# Patient Record
Sex: FEMALE | Race: WHITE | URBAN - METROPOLITAN AREA
[De-identification: names, ages, dates, MRNs, and addresses within clinical notes are randomized per-mention and may not be internally consistent; named-entity substitution may affect disease eponyms.]

---

## 2018-02-19 ENCOUNTER — EMERGENCY (EMERGENCY)
Facility: HOSPITAL | Age: 58
LOS: 1 days | Discharge: HOME | End: 2018-02-19
Attending: EMERGENCY MEDICINE | Admitting: EMERGENCY MEDICINE
Payer: COMMERCIAL

## 2018-02-19 VITALS
HEART RATE: 80 BPM | OXYGEN SATURATION: 100 % | TEMPERATURE: 98 F | SYSTOLIC BLOOD PRESSURE: 131 MMHG | DIASTOLIC BLOOD PRESSURE: 81 MMHG | RESPIRATION RATE: 18 BRPM

## 2018-02-19 PROCEDURE — 99284 EMERGENCY DEPT VISIT MOD MDM: CPT

## 2018-02-19 PROCEDURE — 71046 X-RAY EXAM CHEST 2 VIEWS: CPT | Mod: 26

## 2018-02-19 RX ORDER — AMOXICILLIN 250 MG/5ML
500 SUSPENSION, RECONSTITUTED, ORAL (ML) ORAL ONCE
Qty: 0 | Refills: 0 | Status: COMPLETED | OUTPATIENT
Start: 2018-02-19 | End: 2018-02-19

## 2018-02-19 RX ORDER — IBUPROFEN 200 MG
600 TABLET ORAL ONCE
Qty: 0 | Refills: 0 | Status: COMPLETED | OUTPATIENT
Start: 2018-02-19 | End: 2018-02-19

## 2018-02-19 RX ORDER — AMOXICILLIN 250 MG/5ML
1 SUSPENSION, RECONSTITUTED, ORAL (ML) ORAL
Qty: 14 | Refills: 0 | OUTPATIENT
Start: 2018-02-19 | End: 2018-02-25

## 2018-02-19 RX ORDER — PSEUDOEPHEDRINE HCL 30 MG
30 TABLET ORAL ONCE
Qty: 0 | Refills: 0 | Status: COMPLETED | OUTPATIENT
Start: 2018-02-19 | End: 2018-02-19

## 2018-02-19 RX ORDER — ACETAMINOPHEN 500 MG
650 TABLET ORAL ONCE
Qty: 0 | Refills: 0 | Status: COMPLETED | OUTPATIENT
Start: 2018-02-19 | End: 2018-02-19

## 2018-02-19 RX ADMIN — Medication 600 MILLIGRAM(S): at 20:41

## 2018-02-19 RX ADMIN — Medication 500 MILLIGRAM(S): at 21:29

## 2018-02-19 RX ADMIN — Medication 650 MILLIGRAM(S): at 20:41

## 2018-02-19 RX ADMIN — Medication 30 MILLIGRAM(S): at 20:45

## 2018-02-19 NOTE — ED PROVIDER NOTE - MEDICAL DECISION MAKING DETAILS
multiple symptoms c/w viral syndrome, well appearing with stable vs, will check cxr to eval for pna, symptomatic tx, abx for otitis.

## 2018-02-19 NOTE — ED PROVIDER NOTE - OBJECTIVE STATEMENT
58 y/o f presents with multiple symptoms. Patient states approx 1.5 week ago develop nasal congestion, sneezing, cough, fevers, myalgias. SYmptoms persisted and then developed concurrent moderate constant left ear pain and fullness. Fevers improved, cough persists with ear pain. No ha, neck pain, cp, sob, abd pain, vomiting, diarrhea, dysuria.

## 2018-02-19 NOTE — ED PROVIDER NOTE - CARE PLAN
Principal Discharge DX:	Upper respiratory tract infection, unspecified type  Secondary Diagnosis:	Left otitis media, unspecified otitis media type

## 2023-06-13 PROBLEM — Z00.00 ENCOUNTER FOR PREVENTIVE HEALTH EXAMINATION: Status: ACTIVE | Noted: 2023-06-13

## 2023-06-22 ENCOUNTER — APPOINTMENT (OUTPATIENT)
Dept: OBGYN | Facility: CLINIC | Age: 63
End: 2023-06-22

## 2023-06-22 VITALS
WEIGHT: 135 LBS | SYSTOLIC BLOOD PRESSURE: 138 MMHG | BODY MASS INDEX: 26.5 KG/M2 | DIASTOLIC BLOOD PRESSURE: 82 MMHG | HEIGHT: 60 IN

## 2023-06-27 ENCOUNTER — APPOINTMENT (OUTPATIENT)
Dept: GYNECOLOGIC ONCOLOGY | Facility: CLINIC | Age: 63
End: 2023-06-27
Payer: COMMERCIAL

## 2023-06-27 VITALS
HEART RATE: 65 BPM | HEIGHT: 60 IN | BODY MASS INDEX: 25.91 KG/M2 | SYSTOLIC BLOOD PRESSURE: 137 MMHG | DIASTOLIC BLOOD PRESSURE: 74 MMHG | WEIGHT: 132 LBS

## 2023-06-27 DIAGNOSIS — Z86.39 PERSONAL HISTORY OF OTHER ENDOCRINE, NUTRITIONAL AND METABOLIC DISEASE: ICD-10-CM

## 2023-06-27 DIAGNOSIS — Z78.9 OTHER SPECIFIED HEALTH STATUS: ICD-10-CM

## 2023-06-27 DIAGNOSIS — Z80.0 FAMILY HISTORY OF MALIGNANT NEOPLASM OF DIGESTIVE ORGANS: ICD-10-CM

## 2023-06-27 DIAGNOSIS — Z87.891 PERSONAL HISTORY OF NICOTINE DEPENDENCE: ICD-10-CM

## 2023-06-27 DIAGNOSIS — Z80.6 FAMILY HISTORY OF LEUKEMIA: ICD-10-CM

## 2023-06-27 PROCEDURE — 99205 OFFICE O/P NEW HI 60 MIN: CPT

## 2023-06-27 RX ORDER — ATORVASTATIN CALCIUM 20 MG/1
20 TABLET, FILM COATED ORAL
Refills: 0 | Status: ACTIVE | COMMUNITY

## 2023-06-27 RX ORDER — ATORVASTATIN CALCIUM 80 MG/1
TABLET, FILM COATED ORAL
Refills: 0 | Status: ACTIVE | COMMUNITY

## 2023-06-28 LAB
CANCER AG125 SERPL-ACNC: 9 U/ML
CANCER AG19-9 SERPL-ACNC: 7 U/ML
CEA SERPL-MCNC: 7.4 NG/ML

## 2023-06-30 ENCOUNTER — TRANSCRIPTION ENCOUNTER (OUTPATIENT)
Age: 63
End: 2023-06-30

## 2023-08-08 ENCOUNTER — APPOINTMENT (OUTPATIENT)
Dept: GYNECOLOGIC ONCOLOGY | Facility: CLINIC | Age: 63
End: 2023-08-08
Payer: COMMERCIAL

## 2023-08-08 ENCOUNTER — NON-APPOINTMENT (OUTPATIENT)
Age: 63
End: 2023-08-08

## 2023-08-08 VITALS
DIASTOLIC BLOOD PRESSURE: 63 MMHG | WEIGHT: 134 LBS | HEART RATE: 66 BPM | BODY MASS INDEX: 26.31 KG/M2 | SYSTOLIC BLOOD PRESSURE: 109 MMHG | HEIGHT: 60 IN

## 2023-08-08 PROCEDURE — 99214 OFFICE O/P EST MOD 30 MIN: CPT

## 2023-08-09 NOTE — DISCUSSION/SUMMARY
[Reviewed Clinical Lab Test(s)] : Results of clinical tests were reviewed. [Reviewed Radiology Report(s)] : Radiology reports were reviewed. [Reviewed Radiology Film/Image(s)] : Images from radiology studies were reviewed and examined. [Discuss Tests w/Referring Providers] : Results of labs/radiology studies and the treatment recommendations were discussed with performing/referring physician. [Discuss Alternatives/Risks/Benefits w/Patient] : All alternatives, risks, and benefits were discussed with the patient/family and all questions were answered.  Patient expressed good understanding and appreciates the importance of follow up as recommended. [Visit Time ___ Minutes] : [unfilled] minutes [Face to Face Time___ Minutes] : with [unfilled] minutes in face to face consultation. [FreeTextEntry1] :  -  My[facility] Testing Today via saliva kit -  MRI pelvis - to be done early October after she finishes treatment for newly diagnosed breast cancer -  She will return end of October for follow up; discuss updated MRI and Myriad results. -  Pt verbalized understanding of plan and agrees. -  All questions answered.

## 2023-08-09 NOTE — HISTORY OF PRESENT ILLNESS
[FreeTextEntry1] : Ref: Dr. Castaneda PCP  Ms. Nichols, 63 years old, referred for a pelvic mass.  I initially saw her on 23.  Follow up plan included a follow up TVUS and tumor markers.   Initially, the only complaint was pain with full bladder and occasional dyspareunia; otherwise asymptomatic.   Pt has a hysterectomy in [facility] about 20 years ago for benign indications (i.e. fibroids) via pfannenstiel incision. She is also s/p breast surgery 23 for newly diagnosed breast cancer at [facility].  She recently  was informed of the adjuvant treatment plan for radiation which will occur over approximately 3-4 weeks. We discussed her recent imaging results and risks/benefits of surveillance versus surgical management in detail.   Labs: 23:   = 9;  = 7;  CEA = 7.4  Imagin23 TVUS ([facility]) Pt is s/p hysterectomy.  The ovaries are not visualized likely secondary to either removal or atrophy.   Again noted is a right sided lobulated complex cystic mass with septation as seen on prior MRI and ultrasound. the mass measures 6.8 x 3.8 x 4 cm.  2023 MRI Pelvis [address] Radiology Findings: Uterus: Not visualized Adnexa;: A right adnexal cystic mass is visualized measuring 7.8 x 3.9 x 3.9 cm. It is lobulated in shape. with multiple enhancing septations. No fat or solid enhancing component is visualized. A left adnexal cystic mass is visualized, measuring 3.0 x 2.0 x 2.8 cm, with several thin enhancing septations. No solid or fat component is visualized. Ascites: none urinary Bladder: Normal Lymph Nodes: Normal visualized Bowel: Normal Other: None Impression: 7.8 cm complex right adnexal cyst and 3.0 cm complex left adnexal cyst, represent MRI 0-RADS 3 lesions (approximately 5% rish of malignancy) Status post hysterectomy  2023 US Pelvic [address] Radiology Uterus: Not Seen Ovaries: Not seen, possibly shadowed by bowel gas. There is also a 8.2 cm complex multilocular cyst in the right adnexa Free Fluid: None Impression:  Hysterectomy 8.2 cm complex cyst right adnexa 0-Rads Score 3 Low rish Malignancy Follow up recommendations: MRI pelvis with and without contrast recommended Gynecologist consult recommended  POB: PGYN: No history of abn pap smears PMH: Cholesterol Meds: Atorvastatin Allergies: NKDA PSH: Hysterectomy Cholecystectomy, C/section Social Hx: ,  FH: +Mom/Sister Liver cancer () no genetic testing  Niece (M) breast   Sister Leukemia  Health Maintenance: Covid: received Mammogram: 3/29/2023 BIRAD 6 Colonoscopy: 3/15/2021 PAP: 3/7/2023 negative

## 2023-08-09 NOTE — CHIEF COMPLAINT
[Spouse] : spouse [FreeTextEntry1] : \par  \par  \par  New Patient Consultation for pelvic mass\par  \par  \par

## 2023-08-09 NOTE — PHYSICAL EXAM
[Chaperone Present] : A chaperone was present in the examining room during all aspects of the physical examination [Absent] : Uterus: Absent [Normal] : Recto-Vaginal Exam: Normal [Fully active, able to carry on all pre-disease performance without restriction] : Status 0 - Fully active, able to carry on all pre-disease performance without restriction [FreeTextEntry1] : Marissa DHILLON [de-identified] : pfannenstiel incision well healed  [de-identified] : no fullness appreciated bilaterally

## 2023-08-09 NOTE — ASSESSMENT
[FreeTextEntry1] : 62 yo with recent breast cancer diagnosis (plan for surgery 7/6/23) with likely need for adjuvant radiation referred her with newly found bilateral complex adnexal masses s/p remote open hysterectomy done for benign indications (i.e. fibroids), here for follow up consultation.  - Patients history and work up to date reviewed in detail. - Normal CA-125 and CA 19-9 but elevated CEA. Recommend GI referral to Dr. Alarcon.  - Differential diagnosis of this complex pelvic mass discussed in detail including; benign, premalignant and malignant etiologies. The use of diagrams and drawings were used to help facilitate the conversation throughout. Given the patient is symptomatic with pelvic pain my recommendation would be for surgical removal. The various routes of surgery were discussed in detail and including their risks and benefits and the patient desires to proceed with surgery via a minimally invasive approach via a robotic platform with the understanding of the possibility of a need of conversion to an open procedure. I discussed my clinical suspicion that this is likely a benign neoplasm and possible a benign inclusion cyst from scarring; however, definitive tissue diagnosis is needed to assure this. The -She is also s/p breast surgery 7/6/23 for newly diagnosed breast cancer at [facility].  She recently  was informed of the adjuvant treatment plan for radiation which will occur over approximately 3-4 weeks. We discussed her recent imaging results and risks/benefits of surveillance versus surgical management in detail. The patient would like to complete her breast cancer adjuvant treatment and follow up with close interval imaging with discussion of potential surgical management as indicated at that time.

## 2023-10-15 PROBLEM — C50.911 BREAST CANCER, RIGHT BREAST: Status: ACTIVE | Noted: 2023-06-27

## 2023-10-15 PROBLEM — N94.9 ADNEXAL CYST: Status: ACTIVE | Noted: 2023-06-27

## 2023-10-17 ENCOUNTER — APPOINTMENT (OUTPATIENT)
Dept: GYNECOLOGIC ONCOLOGY | Facility: CLINIC | Age: 63
End: 2023-10-17
Payer: COMMERCIAL

## 2023-10-17 VITALS
DIASTOLIC BLOOD PRESSURE: 61 MMHG | BODY MASS INDEX: 26.31 KG/M2 | HEART RATE: 70 BPM | HEIGHT: 60 IN | WEIGHT: 134 LBS | SYSTOLIC BLOOD PRESSURE: 100 MMHG

## 2023-10-17 DIAGNOSIS — C50.911 MALIGNANT NEOPLASM OF UNSPECIFIED SITE OF RIGHT FEMALE BREAST: ICD-10-CM

## 2023-10-17 DIAGNOSIS — N94.9 UNSPECIFIED CONDITION ASSOCIATED WITH FEMALE GENITAL ORGANS AND MENSTRUAL CYCLE: ICD-10-CM

## 2023-10-17 PROCEDURE — 99214 OFFICE O/P EST MOD 30 MIN: CPT

## 2023-10-18 LAB
CANCER AG125 SERPL-ACNC: 10 U/ML
CANCER AG19-9 SERPL-ACNC: 7 U/ML
CEA SERPL-MCNC: 0.9 NG/ML

## 2024-02-06 ENCOUNTER — APPOINTMENT (OUTPATIENT)
Dept: GYNECOLOGIC ONCOLOGY | Facility: CLINIC | Age: 64
End: 2024-02-06
Payer: COMMERCIAL

## 2024-02-06 VITALS
HEART RATE: 69 BPM | DIASTOLIC BLOOD PRESSURE: 68 MMHG | SYSTOLIC BLOOD PRESSURE: 121 MMHG | WEIGHT: 134 LBS | BODY MASS INDEX: 26.31 KG/M2 | HEIGHT: 60 IN

## 2024-02-06 PROCEDURE — 99213 OFFICE O/P EST LOW 20 MIN: CPT

## 2024-02-06 NOTE — ASSESSMENT
[FreeTextEntry1] : 64 yo with recent breast cancer diagnosis (plan for surgery 7/6/23) and completed adjuvant radiation now on anastrazole referred her with newly found bilateral complex adnexal masses s/p remote open hysterectomy done for benign indications (i.e. fibroids), here for follow up consultation.

## 2024-02-06 NOTE — HISTORY OF PRESENT ILLNESS
[FreeTextEntry1] : Ref: Dr. Castaneda PCP  Ms. Nichols, 63 years old, referred for a pelvic mass.  I initially saw her on 23.    Pt has a hysterectomy in [facility] about 20 years ago for benign indications (i.e. fibroids) via Pfannenstiel incision. She is also s/p breast surgery 23 for newly diagnosed breast cancer at [facility].  She recently was informed of the adjuvant treatment plan for radiation which will occur over approximately 3-4 weeks. We discussed her recent imaging results and risks/benefits of surveillance versus surgical management in detail. She desired to proceed with interval follow-up imaging and completed her RT treatment. She is currently on anastrazole. She had germline testing collected but her insurance did not cover it so it was 7.6 cm right adnexal complex cyst and a 2.7 cm left adnexal complex cyst, not significantly changed from 2023. She was again extensively counseled about the risks/benefits of surgery and surveillance as well as the limiatations of surveillance and desires to continue with surveillance. She is without complaint. She denies fevers, chills, night sweats, chest pain, SOB, changes in appetite, nausea, vomiting, increased abdominal girth, unintentional weight loss, abdominopelvic pain, lower extremity edema or pain and changes in bowel/bladder movements. Denies vaginal bleeding and abnormal vaginal discharge. Repeat CA-125 drawn today. We reviewed her recent MRI pelvis demonstrating Bilateral complex adnexal cyst likely benign, however, given their size and complexity combined with the patient's age continue close follow-up is recommended with the pre and postcontrast MRI of the pelvis. She is overall without complaint, recently treated for a UTI.  We discussed again the management options and risk/benefits of all options. Shared decision made to proceed with continued observation with close interval follow up after the end of May when repeat MRI pelvis is to be done. Education provided on signs/symptoms of recurrence and when to seek follow up sooner than our scheduled surveillance. She expressed verbal understanding.   Labs: 10/17/2023:  = 10; CA 19-9 = 7; CEA = 0.9 23:   = 9;  = 7;  CEA = 7.4  Imagin24 MRI pelvis ([facility]) Uterus: Removed. Ovaries: There are bilateral complex cysts.  There is a cystic structure in the right ovary which may be multiple cysts adjacent to each other or hydrosalpinx.  It measures 8.1 x 3.9 cm, previously 7.9 x 4 cm overall not significantly changed.  It is predominantly high T2 signal.  There is an intermediate T1 signal and does not enhance, consistent with a hemorrhagio-proteinacious component.  There is a smaller cystic lesion in left ovary measuring 2.8 x 1.5 cm, not significantly changed in size series 4 image 12.3 there are multiple cysts, which demonstrate high T2 signal.  There is no enhancement of the post contrast subtraction images.  These complex cysts are likely benign, however given their size and the patient's age continued MRI follow-up recommended in 6 months.  MRI 0 RADS score 2-almost certainly benign, less than 1% risk of malignancy. Adenopathy: No significant adenopathy is present Osseous structures: Without focal mass. Fluid: No abnormal pelvic fluid. Urinary bladder: Unremarkable.  No evidence of enhancing bladder mass. Other: None. Impression: Bilateral complex adnexal cyst likely benign, however, given their size and complexity combined with the patient's age continue close follow-up is recommended with the pre and postcontrast MRI of the pelvis in 6 months.  POB: PGYN: No history of abn pap smears PMH: Cholesterol Meds: Atorvastatin Allergies: NKDA PSH: Hysterectomy Cholecystectomy, C/section Social Hx: ,  FH: +Mom/Sister Liver cancer () no genetic testing  Niece (M) breast   Sister Leukemia  Health Maintenance: Covid: received Mammogram: 3/29/2023 BIRAD 6 Colonoscopy: 3/15/2021 PAP: 3/7/2023 negative

## 2024-02-06 NOTE — DISCUSSION/SUMMARY
[Reviewed Clinical Lab Test(s)] : Results of clinical tests were reviewed. [Reviewed Radiology Report(s)] : Radiology reports were reviewed. [Reviewed Radiology Film/Image(s)] : Images from radiology studies were reviewed and examined. [Discuss Tests w/Referring Providers] : Results of labs/radiology studies and the treatment recommendations were discussed with performing/referring physician. [Discuss Alternatives/Risks/Benefits w/Patient] : All alternatives, risks, and benefits were discussed with the patient/family and all questions were answered.  Patient expressed good understanding and appreciates the importance of follow up as recommended. [Visit Time ___ Minutes] : [unfilled] minutes [Face to Face Time___ Minutes] : with [unfilled] minutes in face to face consultation. [FreeTextEntry1] : - Patients history and work up to date reviewed in detail with both her and her partner. - She s/p breast surgery 7/6/23 for newly diagnosed breast cancer at [facility].  Now she is currently on anastrazole. She had myriad germline testing collected but her insurance did not cover it so it was 7.6 cm right adnexal complex cyst and a 2.7 cm left adnexal complex cyst, not significantly changed from 6/8/2023.  -Repeat CA-125 drawn today. We reviewed her recent MRI pelvis demonstrating Bilateral complex adnexal cyst likely benign, however, given their size and complexity combined with the patient's age continue close follow-up is recommended with the pre and postcontrast MRI of the pelvis. She is overall without complaint, recently treated for a UTI.  We discussed again the management options and risk/benefits of all options. Shared decision made to proceed with continued observation with close interval follow up after the end of May when repeat MRI pelvis is to be done. Education provided on signs/symptoms of recurrence and when to seek follow up sooner than our scheduled surveillance. She expressed verbal understanding. - For follow up MRI pelvis end of May with close follow up thereafter but precautions given to return sooner if she is to develop symptoms. - I spent the time noted on the day of this patient encounter preparing for, providing and documenting the above service. I have counseled and educated the patient on the differential, workup, disease course, and treatment/management plan. Education was provided to the patient during this encounter. All questions and concerns were answered and addressed in detail.

## 2024-02-06 NOTE — PHYSICAL EXAM
[Chaperone Present] : A chaperone was present in the examining room during all aspects of the physical examination [Absent] : Uterus: Absent [Normal] : Recto-Vaginal Exam: Normal [de-identified] : pfannenstiel incision well healed  [de-identified] : no fullness appreciated bilaterally [Fully active, able to carry on all pre-disease performance without restriction] : Status 0 - Fully active, able to carry on all pre-disease performance without restriction [FreeTextEntry1] : Marissa DHILLON

## 2024-02-08 LAB — CANCER AG125 SERPL-ACNC: 10 U/ML

## 2024-07-03 ENCOUNTER — RESULT REVIEW (OUTPATIENT)
Age: 64
End: 2024-07-03

## 2024-07-03 ENCOUNTER — OUTPATIENT (OUTPATIENT)
Dept: OUTPATIENT SERVICES | Facility: HOSPITAL | Age: 64
LOS: 1 days | End: 2024-07-03
Payer: COMMERCIAL

## 2024-07-03 ENCOUNTER — APPOINTMENT (OUTPATIENT)
Dept: MRI IMAGING | Facility: CLINIC | Age: 64
End: 2024-07-03
Payer: COMMERCIAL

## 2024-07-03 DIAGNOSIS — N94.9 UNSPECIFIED CONDITION ASSOCIATED WITH FEMALE GENITAL ORGANS AND MENSTRUAL CYCLE: ICD-10-CM

## 2024-07-03 PROCEDURE — 72197 MRI PELVIS W/O & W/DYE: CPT | Mod: 26

## 2024-07-03 PROCEDURE — 72197 MRI PELVIS W/O & W/DYE: CPT

## 2024-07-03 PROCEDURE — A9585: CPT

## 2024-08-02 ENCOUNTER — APPOINTMENT (OUTPATIENT)
Dept: GYNECOLOGIC ONCOLOGY | Facility: CLINIC | Age: 64
End: 2024-08-02
Payer: COMMERCIAL

## 2024-08-02 PROCEDURE — 99212 OFFICE O/P EST SF 10 MIN: CPT | Mod: 95

## 2024-08-02 NOTE — HISTORY OF PRESENT ILLNESS
[FreeTextEntry1] : **Please note that this visit was converted to telemed due to technical difficulties** Permission was granted for this visit.  Ref: Dr. Castaneda PCP  Ms. Nichols, 63 years old, referred for a pelvic mass.  I initially saw her on 23.    Pt has a hysterectomy in [facility] about 20 years ago for benign indications (i.e. fibroids) via Pfannenstiel incision. She is also s/p breast surgery 23 for newly diagnosed breast cancer at [facility].  She recently was informed of the adjuvant treatment plan for radiation which will occur over approximately 3-4 weeks. We discussed her recent imaging results and risks/benefits of surveillance versus surgical management in detail. She desired to proceed with interval follow-up imaging and completed her RT treatment. She is currently on anastrazole. She had germline testing collected but her insurance did not cover it so it was 7.6 cm right adnexal complex cyst and a 2.7 cm left adnexal complex cyst, not significantly changed from 2023. She was again extensively counseled about the risks/benefits of surgery and surveillance as well as the limiatations of surveillance and desires to continue with surveillance. She is without complaint. She denies fevers, chills, night sweats, chest pain, SOB, changes in appetite, nausea, vomiting, increased abdominal girth, unintentional weight loss, abdominopelvic pain, lower extremity edema or pain and changes in bowel/bladder movements. Denies vaginal bleeding and abnormal vaginal discharge. Repeat CA-125 drawn today. We reviewed her recent MRI pelvis demonstrating Bilateral complex adnexal cyst likely benign, however, given their size and complexity combined with the patient's age continue close follow-up is recommended with the pre and postcontrast MRI of the pelvis. She is overall without complaint, recently treated for a UTI.  We discussed again the management options and risk/benefits of all options. Shared decision made to proceed with continued observation with close interval follow up after the end of May when repeat MRI pelvis is to be done. Education provided on signs/symptoms of recurrence and when to seek follow up sooner than our scheduled surveillance. She expressed verbal understanding.  Today the patient presents via vteb to review her most recent MRI pelvis, which we discussed in detail (results below). She reports. She denies fevers, chills, night sweats, chest pain, SOB, changes in appetite, nausea, vomiting, increased abdominal girth, unintentional weight loss, abdominopelvic pain, lower extremity edema or pain and changes in bowel/bladder movements. Denies vaginal bleeding and abnormal vaginal discharge.   Labs: 10/17/2023:  = 10; CA 19-9 = 7; CEA = 0.9 23:   = 9;  = 7;  CEA = 7.4  Imaging: MRI pelvis 7/3/24:  FINDINGS: UTERUS: Hysterectomy.  RIGHT OVARY/ADNEXA: A normal right ovary is not visualized. A 7.8 x 3.6 cm multilocular cystic right adnexal lesion versus cluster of numerous cysts is unchanged from 2023 MRI. A 2.8 cm bilobed component anteriorly of T2 intermediate to dark signal is suggestive of a hemorrhagic component or endometrioma. No solid enhancement or nodularity. LEFT OVARY/ADNEXA: A 1.4 cm cyst of the left ovary is decreased in size, previously 1.7 cm on 2023. Additional 0.8 cm cyst of the left ovary is decreased in size, previously 1.2 cm.  BLADDER: Within normal limits.  LYMPH NODES: No pelvic lymphadenopathy.  VISUALIZED PORTIONS:  ABDOMINAL ORGANS: Within normal limits. BOWEL: Within normal limits. PERITONEUM: No ascites. VESSELS: Within normal limits. ABDOMINAL WALL: Within normal limits. BONES: Within normal limits.  IMPRESSION: *  A 7.8 cm multilocular cystic right adnexal lesion is stable from 2023 and without soft tissue component or enhancing nodularity. Follow-up MRI pelvis with IV contrast recommended in one year. *  Small left ovarian cysts are decreased in size from 2023.  24 MRI pelvis ([facility]) Uterus: Removed. Ovaries: There are bilateral complex cysts.  There is a cystic structure in the right ovary which may be multiple cysts adjacent to each other or hydrosalpinx.  It measures 8.1 x 3.9 cm, previously 7.9 x 4 cm overall not significantly changed.  It is predominantly high T2 signal.  There is an intermediate T1 signal and does not enhance, consistent with a hemorrhagio-proteinacious component.  There is a smaller cystic lesion in left ovary measuring 2.8 x 1.5 cm, not significantly changed in size series 4 image 12.3 there are multiple cysts, which demonstrate high T2 signal.  There is no enhancement of the post contrast subtraction images.  These complex cysts are likely benign, however given their size and the patient's age continued MRI follow-up recommended in 6 months.  MRI 0 RADS score 2-almost certainly benign, less than 1% risk of malignancy. Adenopathy: No significant adenopathy is present Osseous structures: Without focal mass. Fluid: No abnormal pelvic fluid. Urinary bladder: Unremarkable.  No evidence of enhancing bladder mass. Other: None. Impression: Bilateral complex adnexal cyst likely benign, however, given their size and complexity combined with the patient's age continue close follow-up is recommended with the pre and postcontrast MRI of the pelvis in 6 months.  POB: PGYN: No history of abn pap smears PMH: Cholesterol Meds: Atorvastatin Allergies: NKDA PSH: Hysterectomy Cholecystectomy, C/section Social Hx: ,  FH: +Mom/Sister Liver cancer () no genetic testing  Niece (M) breast   Sister Leukemia  Health Maintenance: Covid: received Mammogram: 3/29/2023 BIRAD 6 Colonoscopy: 3/15/2021 PAP: 3/7/2023 negative

## 2024-08-02 NOTE — DISCUSSION/SUMMARY
[Reviewed Clinical Lab Test(s)] : Results of clinical tests were reviewed. [Reviewed Radiology Report(s)] : Radiology reports were reviewed. [Reviewed Radiology Film/Image(s)] : Images from radiology studies were reviewed and examined. [Discuss Tests w/Referring Providers] : Results of labs/radiology studies and the treatment recommendations were discussed with performing/referring physician. [Discuss Alternatives/Risks/Benefits w/Patient] : All alternatives, risks, and benefits were discussed with the patient/family and all questions were answered.  Patient expressed good understanding and appreciates the importance of follow up as recommended. [Visit Time ___ Minutes] : [unfilled] minutes [Face to Face Time___ Minutes] : with [unfilled] minutes in face to face consultation. [FreeTextEntry1] : - Patients history and work up to date reviewed in detail. - She s/p breast surgery 7/6/23 for newly diagnosed breast cancer at [facility].  Now she is currently on anastrazole. She had myriad germline testing collected but her insurance did not cover it so it was 7.6 cm right adnexal complex cyst stable and a 2.7 cm left adnexal complex cyst decreased in size. -Repeat CA-125 drawn at last visit and normal. We reviewed her recent MRI pelvis demonstrating Bilateral complex adnexal cyst likely benign, however, given their size and complexity combined with the patient's age continue close follow-up is recommended with the pre and postcontrast MRI of the pelvis. She is overall without complaint, recently treated for a UTI.  We discussed again the management options and risk/benefits of all options. Shared decision made to proceed with continued observation with close interval follow up in 6 months with repeat MRI pelvis is to be done. Education provided on signs/symptoms of recurrence and when to seek follow up sooner than our scheduled surveillance. She expressed verbal understanding. - For follow up MRI pelvis end of May with close follow up thereafter but precautions given to return sooner if she is to develop symptoms. - I spent the time noted on the day of this patient encounter preparing for, providing and documenting the above service. I have counseled and educated the patient on the differential, workup, disease course, and treatment/management plan. Education was provided to the patient during this encounter. All questions and concerns were answered and addressed in detail.

## 2024-11-22 DIAGNOSIS — R10.9 UNSPECIFIED ABDOMINAL PAIN: ICD-10-CM

## 2024-11-22 RX ORDER — TRAMADOL HYDROCHLORIDE 50 MG/1
50 TABLET, COATED ORAL
Qty: 20 | Refills: 0 | Status: ACTIVE | COMMUNITY
Start: 2024-11-22 | End: 1900-01-01

## 2024-12-08 PROBLEM — N39.0 URINARY TRACT INFECTION, BACTERIAL: Status: ACTIVE | Noted: 2024-12-08 | Resolved: 2025-01-07

## 2024-12-10 ENCOUNTER — APPOINTMENT (OUTPATIENT)
Dept: GYNECOLOGIC ONCOLOGY | Facility: CLINIC | Age: 64
End: 2024-12-10
Payer: COMMERCIAL

## 2024-12-10 VITALS
WEIGHT: 131 LBS | TEMPERATURE: 97.9 F | RESPIRATION RATE: 17 BRPM | HEART RATE: 73 BPM | BODY MASS INDEX: 25.72 KG/M2 | HEIGHT: 60 IN | OXYGEN SATURATION: 98 % | SYSTOLIC BLOOD PRESSURE: 129 MMHG | DIASTOLIC BLOOD PRESSURE: 77 MMHG

## 2024-12-10 DIAGNOSIS — N94.9 UNSPECIFIED CONDITION ASSOCIATED WITH FEMALE GENITAL ORGANS AND MENSTRUAL CYCLE: ICD-10-CM

## 2024-12-10 PROCEDURE — 99214 OFFICE O/P EST MOD 30 MIN: CPT

## 2024-12-10 PROCEDURE — 99459 PELVIC EXAMINATION: CPT

## 2024-12-17 ENCOUNTER — APPOINTMENT (OUTPATIENT)
Dept: GYNECOLOGIC ONCOLOGY | Facility: CLINIC | Age: 64
End: 2024-12-17